# Patient Record
Sex: FEMALE | Race: BLACK OR AFRICAN AMERICAN | NOT HISPANIC OR LATINO | Employment: FULL TIME | URBAN - METROPOLITAN AREA
[De-identification: names, ages, dates, MRNs, and addresses within clinical notes are randomized per-mention and may not be internally consistent; named-entity substitution may affect disease eponyms.]

---

## 2017-05-19 ENCOUNTER — COMMUNICATION - HEALTHEAST (OUTPATIENT)
Dept: TELEHEALTH | Facility: CLINIC | Age: 22
End: 2017-05-19

## 2017-05-19 ENCOUNTER — OFFICE VISIT - HEALTHEAST (OUTPATIENT)
Dept: FAMILY MEDICINE | Facility: CLINIC | Age: 22
End: 2017-05-19

## 2017-05-19 DIAGNOSIS — J30.2 SEASONAL ALLERGIES: ICD-10-CM

## 2017-05-19 DIAGNOSIS — Z30.9 CONTRACEPTION MANAGEMENT: ICD-10-CM

## 2017-05-19 DIAGNOSIS — J45.20 MILD INTERMITTENT ASTHMA: ICD-10-CM

## 2017-05-19 DIAGNOSIS — E66.9 OBESITY: ICD-10-CM

## 2017-05-19 DIAGNOSIS — R87.612 LOW GRADE SQUAMOUS INTRAEPITH LESION ON CYTOLOGIC SMEAR CERVIX (LGSIL): ICD-10-CM

## 2017-05-19 DIAGNOSIS — N91.2 AMENORRHEA: ICD-10-CM

## 2017-05-19 ASSESSMENT — MIFFLIN-ST. JEOR: SCORE: 2028.86

## 2017-05-31 ENCOUNTER — AMBULATORY - HEALTHEAST (OUTPATIENT)
Dept: NURSING | Facility: CLINIC | Age: 22
End: 2017-05-31

## 2017-05-31 DIAGNOSIS — N91.2 AMENORRHEA: ICD-10-CM

## 2017-08-30 ENCOUNTER — AMBULATORY - HEALTHEAST (OUTPATIENT)
Dept: NURSING | Facility: CLINIC | Age: 22
End: 2017-08-30

## 2017-08-30 DIAGNOSIS — Z30.42 DEPO-PROVERA CONTRACEPTIVE STATUS: ICD-10-CM

## 2018-01-24 ENCOUNTER — COMMUNICATION - HEALTHEAST (OUTPATIENT)
Dept: FAMILY MEDICINE | Facility: CLINIC | Age: 23
End: 2018-01-24

## 2018-09-20 ENCOUNTER — COMMUNICATION - HEALTHEAST (OUTPATIENT)
Dept: FAMILY MEDICINE | Facility: CLINIC | Age: 23
End: 2018-09-20

## 2019-11-21 ENCOUNTER — HOSPITAL ENCOUNTER (EMERGENCY)
Facility: CLINIC | Age: 24
Discharge: HOME | End: 2019-11-22
Attending: EMERGENCY MEDICINE | Admitting: EMERGENCY MEDICINE
Payer: COMMERCIAL

## 2019-11-21 DIAGNOSIS — R51.9 NONINTRACTABLE HEADACHE, UNSPECIFIED CHRONICITY PATTERN, UNSPECIFIED HEADACHE TYPE: ICD-10-CM

## 2019-11-21 PROCEDURE — 99284 EMERGENCY DEPT VISIT MOD MDM: CPT | Mod: Z6 | Performed by: EMERGENCY MEDICINE

## 2019-11-21 PROCEDURE — 25000132 ZZH RX MED GY IP 250 OP 250 PS 637: Performed by: EMERGENCY MEDICINE

## 2019-11-21 PROCEDURE — 99283 EMERGENCY DEPT VISIT LOW MDM: CPT

## 2019-11-21 RX ORDER — DIPHENHYDRAMINE HCL 25 MG
25 CAPSULE ORAL ONCE
Status: COMPLETED | OUTPATIENT
Start: 2019-11-21 | End: 2019-11-21

## 2019-11-21 RX ORDER — METOCLOPRAMIDE 10 MG/1
10 TABLET ORAL
Status: DISCONTINUED | OUTPATIENT
Start: 2019-11-21 | End: 2019-11-22 | Stop reason: HOSPADM

## 2019-11-21 RX ORDER — CYCLOBENZAPRINE HCL 10 MG
5 TABLET ORAL 3 TIMES DAILY PRN
Qty: 10 TABLET | Refills: 0 | Status: SHIPPED | OUTPATIENT
Start: 2019-11-21

## 2019-11-21 RX ORDER — IBUPROFEN 100 MG/5ML
400 SUSPENSION, ORAL (FINAL DOSE FORM) ORAL ONCE
Status: COMPLETED | OUTPATIENT
Start: 2019-11-21 | End: 2019-11-21

## 2019-11-21 RX ORDER — IBUPROFEN 800 MG/1
800 TABLET, FILM COATED ORAL EVERY 8 HOURS PRN
COMMUNITY

## 2019-11-21 RX ORDER — CYCLOBENZAPRINE HCL 10 MG
10 TABLET ORAL ONCE
Status: COMPLETED | OUTPATIENT
Start: 2019-11-21 | End: 2019-11-21

## 2019-11-21 RX ADMIN — IBUPROFEN 400 MG: 100 SUSPENSION ORAL at 21:52

## 2019-11-21 RX ADMIN — METOCLOPRAMIDE 10 MG: 10 TABLET ORAL at 22:39

## 2019-11-21 RX ADMIN — CYCLOBENZAPRINE 10 MG: 10 TABLET, FILM COATED ORAL at 23:56

## 2019-11-21 RX ADMIN — DIPHENHYDRAMINE HYDROCHLORIDE 25 MG: 25 CAPSULE ORAL at 22:30

## 2019-11-21 ASSESSMENT — ENCOUNTER SYMPTOMS
ABDOMINAL PAIN: 0
CONFUSION: 0
ARTHRALGIAS: 0
SHORTNESS OF BREATH: 0
DIFFICULTY URINATING: 0
NECK STIFFNESS: 0
HEADACHES: 1
FEVER: 0
PHOTOPHOBIA: 1
NAUSEA: 1
EYE REDNESS: 0
COLOR CHANGE: 0

## 2019-11-21 ASSESSMENT — PAIN DESCRIPTION - DESCRIPTORS
DESCRIPTORS: HEADACHE
DESCRIPTORS: HEADACHE

## 2019-11-21 NOTE — LETTER
November 21, 2019      To Whom It May Concern:      Troy Knowles was seen in our Emergency Department today, 11/21/19.  I expect her condition to improve over the next 2 days.  She may return to work when improved.    Sincerely,        Mulugeta Mcgregor, DO

## 2019-11-21 NOTE — ED AVS SNAPSHOT
[facility], [place], Emergency Department  [address]  [facility] [place] [address]  Phone:  [phone]  Fax:  [phone]                                    Troy Knowles   MRN: [id number]    Department:  [facility], Emergency Department   Date of Visit:  11/21/2019           After Visit Summary Signature Page    I have received my discharge instructions, and my questions have been answered. I have discussed any challenges I see with this plan with the nurse or doctor.    ..........................................................................................................................................  Patient/Patient Representative Signature      ..........................................................................................................................................  Patient Representative Print Name and Relationship to Patient    ..................................................               ................................................  Date                                   Time    ..........................................................................................................................................  Reviewed by Signature/Title    ...................................................              ..............................................  Date                                               Time          22EPIC Rev 08/18

## 2019-11-22 VITALS
OXYGEN SATURATION: 97 % | HEART RATE: 84 BPM | SYSTOLIC BLOOD PRESSURE: 137 MMHG | TEMPERATURE: 97.8 F | RESPIRATION RATE: 15 BRPM | DIASTOLIC BLOOD PRESSURE: 90 MMHG

## 2019-11-22 NOTE — DISCHARGE INSTRUCTIONS
Please make an appointment to follow up with [facility] (phone: [phone] as soon as possible to establish care. Return to the emergency department if symptoms continue, get worse, there are any new symptoms or any cause for concern.

## 2019-11-22 NOTE — ED PROVIDER NOTES
[facility] EMERGENCY DEPARTMENT ([facility])    11/21/19     ED 20  10:11 PM   History     Chief Complaint   Patient presents with     Headache     Hx of migraines, on day 3 of migraine pain in back/front of head     The history is provided by the patient and medical records.     Troy Knowles is a 24 year old female who presents with migraine headache for the past 3 days. She was at work when her headache began. Headache is focal to frontal and occipital head. In he past she used to get similar headaches all the time, less so lately. She wonders if it is related to her menstrual cycle. She denies any other triggers. She endorses nausea. She has photophobia and phonophobia, no vision changes. She tried ibuprofen 800 mg yesterday.     I have reviewed the Medications, Allergies, Past Medical and Surgical History, and Social History in the [employer] system.  PAST MEDICAL HISTORY: History reviewed. No pertinent past medical history.    PAST SURGICAL HISTORY: History reviewed. No pertinent surgical history.    FAMILY HISTORY: History reviewed. No pertinent family history.    SOCIAL HISTORY:   Social History     Tobacco Use     Smoking status: Never Smoker     Smokeless tobacco: Never Used   Substance Use Topics     Alcohol use: Not Currently       Patient's Medications   New Prescriptions    No medications on file   Previous Medications    IBUPROFEN (ADVIL/MOTRIN) 800 MG TABLET    Take 800 mg by mouth every 8 hours as needed for moderate pain   Modified Medications    No medications on file   Discontinued Medications    No medications on file          Allergies   Allergen Reactions     Latex Rash      Review of Systems   Constitutional: Negative for fever.   HENT: Negative for congestion.    Eyes: Positive for photophobia. Negative for redness and visual disturbance.   Respiratory: Negative for shortness of breath.    Cardiovascular: Negative for chest pain.   Gastrointestinal: Positive for nausea. Negative for  abdominal pain.   Genitourinary: Negative for difficulty urinating.   Musculoskeletal: Negative for arthralgias and neck stiffness.   Skin: Negative for color change.   Neurological: Positive for headaches.   Psychiatric/Behavioral: Negative for confusion.   All other systems reviewed and are negative.      Physical Exam   BP: (!) 157/99  Pulse: 82  Temp: 97.6  F (36.4  C)  Resp: 16  SpO2: 98 %      Physical Exam  Constitutional:       General: She is not in acute distress.     Appearance: She is well-developed. She is not diaphoretic.   HENT:      Head: Normocephalic and atraumatic.      Mouth/Throat:      Pharynx: No oropharyngeal exudate.   Eyes:      General: No scleral icterus.        Right eye: No discharge.         Left eye: No discharge.      Pupils: Pupils are equal, round, and reactive to light.   Neck:      Musculoskeletal: Normal range of motion and neck supple. No neck rigidity.   Cardiovascular:      Rate and Rhythm: Normal rate and regular rhythm.      Heart sounds: Normal heart sounds. No murmur. No friction rub. No gallop.    Pulmonary:      Effort: Pulmonary effort is normal. No respiratory distress.      Breath sounds: Normal breath sounds. No wheezing.   Chest:      Chest wall: No tenderness.   Abdominal:      General: Bowel sounds are normal. There is no distension.      Palpations: Abdomen is soft.      Tenderness: There is no abdominal tenderness.   Musculoskeletal: Normal range of motion.         General: No tenderness or deformity.   Skin:     General: Skin is warm and dry.      Coloration: Skin is not pale.      Findings: No erythema or rash.   Neurological:      General: No focal deficit present.      Mental Status: She is alert and oriented to person, place, and time. Mental status is at baseline.      Cranial Nerves: No cranial nerve deficit.         ED Course        Procedures  No results found for this or any previous visit (from the past 24 hour(s)).  Medications   ibuprofen  (ADVIL/MOTRIN) suspension 400 mg (400 mg Oral Given 11/21/19 7166)         Assessments & Plan (with Medical Decision Making)   Is a 24-year-old female who presents with headache.  This is been present for 3 days.  She has had several similar headaches in the past that she attributes to migraines.  No known precipitating factors for this episode.  Exam demonstrates no acute abnormalities.  There is no neck rigidity.  No focal neuro deficits.  Patient was given ibuprofen, metoclopramide and diphenhydramine with a decrease in her headache.  However she did continue to still have some residual headache.  Due to the nature of this headache there may be some tension component to it and we will trial patient on a short course of a muscle relaxer. Will discharge home with return precautions. Discussed reasons to return to the emergency department.  Patient understands and agrees with this plan.    I have reviewed the nursing notes.    I have reviewed the findings, diagnosis, plan and need for follow up with the patient.    New Prescriptions    No medications on file       Final diagnoses:   None     I, Sejal Olivares, am serving as a trained medical scribe to document services personally performed by Mulugeta Mcgregor DO based on the provider's statements to me on November 21, 2019.  This document has been checked and approved by the attending provider.    I, Mulugeta Mcgregor DO, was physically present and have reviewed and verified the accuracy of this note documented by Sejal Olivares, medical scribe.     11/21/2019   [facility], EMERGENCY DEPARTMENT     Mulugeta Mcgregor DO  11/21/19 [id number]

## 2021-05-31 VITALS — WEIGHT: 285 LBS | BODY MASS INDEX: 47.48 KG/M2 | HEIGHT: 65 IN

## 2021-06-10 NOTE — PROGRESS NOTES
[facility]  FAMILY  MEDICINE  OFFICE  VISIT     ASSESSMENT & PLAN     Diagnoses and all orders for this visit:    Mild intermittent asthma  -     fluticasone (FLOVENT HFA) 110 mcg/actuation inhaler; Inhale 2 puffs 2 (two) times a day.  Dispense: 1 Inhaler; Refill: 2  -     albuterol (PROAIR HFA;PROVENTIL HFA;VENTOLIN HFA) 90 mcg/actuation inhaler; Inhale 2 puffs every 6 (six) hours as needed for wheezing.  Dispense: 18 g; Refill: 6  We will have her start a daily controller, and give a trial to Xyzal for allergy control.    Seasonal allergies  -     levocetirizine (XYZAL) 2.5 mg/5 mL solution; Take 10 mL (5 mg total) by mouth every evening.  Dispense: 148 mL; Refill: 12    Amenorrhea  -     Pregnancy, Urine    Low grade squamous intraepith lesion on cytologic smear cervix (lgsil)  she will make an appointment in the summer for a repeat Pap smear  Obesity  encouraged weight loss program.  Contraception management  We will get an urine pregnancy test today, have her abstain from sex for the next 2 weeks, and do another one before she get her Depo shot       SUBJECTIVE   No specialty comments available.  S: 20 yo Female with PMH of seasonal allergies and asthma who presents today with dry cough x1 week and to begin her Depo shot for birth control. Patient has severe seasonal allergies and had symptoms of sneezing, rhinorrhea, watery eyes and itching eyes for 2 weeks. Approximately 1 week ago she began having severe dry cough. Over the last 3 days cough has been improving, but is now productive of whitish phlegm. Patient does not currently have a rescue inhaler as she has not had symptoms of asthma for many years, but she has been using nephews inhaler 2x/day for approximately 1 week to help with symptoms of SOB and coughing. She does note some new chest pain that is present with deep inspiration.  Patient requesting Depo shot today for birth  "control. She has used Depo shot for past 7 years and she gets her cycle every 3 months. Her weight has remained stable. Her last shot was Nov. 2016 at [facility].      OBJECTIVE    /76 (Patient Site: Right Arm)  Pulse 72  Temp 98.3  F (36.8  C) (Oral)   Resp 15  Ht 5' 4.7\" (1.643 m)  Wt (!) 285 lb (129.3 kg)  LMP 01/01/2010  SpO2 98%  BMI 47.87 kg/m2  Physical Exam   Constitutional: She appears well-developed and well-nourished.   HENT:   Head: Normocephalic and atraumatic.   Neck: Normal range of motion. Neck supple. No JVD present. No tracheal deviation present. No thyromegaly present.   Cardiovascular: Normal rate, regular rhythm, normal heart sounds and intact distal pulses.  Exam reveals no gallop and no friction rub.    No murmur heard.  Pulmonary/Chest: Effort normal and breath sounds normal. No respiratory distress. She has no wheezes. She has no rales.   Musculoskeletal: She exhibits no edema or tenderness.   Lymphadenopathy:     She has no cervical adenopathy.   Psychiatric: She has a normal mood and affect. Judgment and thought content normal.     Mora Whatley MD  Family Medicine, [facility]   This transcription uses voice recognition software, which may contain typographical errors. Chief Complaint:    Chief Complaint   Patient presents with     Cough      \"DEEP, DRY, COUGH\"     Injections     RESTART DEPO     Allergies     \"POSS ALLERGIES\"     Troy Knowles is a 21 y.o. female.  05/19/17 - Updated [facility]       Health Maintenance Due   Topic Date Due     ASTHMA CONTROL TEST  1994     ASTHMA FOLLOW-UP  1994     CHLAMYDIA SCREENING  11/06/2009     PAP SMEAR  11/06/2015       Patient Active Problem List   Diagnosis     Obesity     Low grade squamous intraepith lesion on cytologic smear cervix (lgsil)     Mild intermittent asthma     Seasonal allergies     No past medical history on file.    Current Outpatient Prescriptions   Medication Sig " "Dispense Refill     albuterol (PROAIR HFA;PROVENTIL HFA;VENTOLIN HFA) 90 mcg/actuation inhaler Inhale 2 puffs every 6 (six) hours as needed for wheezing. 18 g 6     fluticasone (FLOVENT HFA) 110 mcg/actuation inhaler Inhale 2 puffs 2 (two) times a day. 1 Inhaler 2     levocetirizine (XYZAL) 2.5 mg/5 mL solution Take 10 mL (5 mg total) by mouth every evening. 148 mL 12     No current facility-administered medications for this visit.      Allergies as of 05/19/2017     (No Known Allergies)     ROS    \"Review of systems complete head to toe is otherwise negative.\"}  OTHER     Wt Readings from Last 3 Encounters:   05/19/17 (!) 285 lb (129.3 kg)       PHQ-9:  PHQ-2 Total Score: 0 (5/19/2017  3:00 PM)    No Follow-up on file.    Social History   Substance Use Topics     Smoking status: Former Smoker     Smokeless tobacco: None     Alcohol use None       Family History   Problem Relation Age of Onset     No Medical Problems Mother      Diabetes Father      Stroke Father        No past surgical history on file.    Social History     Social History Narrative     No narrative on file       RESULTS (Lab/Rad)     Recent Results (from the past 168 hour(s))   Pregnancy, Urine   Result Value Ref Range    Pregnancy Test, Urine Negative Negative    Specific Gravity, UA 1.015 1.001 - 1.030     No results found.           "

## 2021-06-15 PROBLEM — E66.9 OBESITY: Status: ACTIVE | Noted: 2017-05-19

## 2021-06-15 PROBLEM — R87.612 LOW GRADE SQUAMOUS INTRAEPITH LESION ON CYTOLOGIC SMEAR CERVIX (LGSIL): Status: ACTIVE | Noted: 2017-05-19

## 2021-06-15 PROBLEM — J45.20 MILD INTERMITTENT ASTHMA: Status: ACTIVE | Noted: 2017-05-19

## 2021-06-15 PROBLEM — J30.2 SEASONAL ALLERGIES: Status: ACTIVE | Noted: 2017-05-19

## 2021-07-31 ENCOUNTER — HEALTH MAINTENANCE LETTER (OUTPATIENT)
Age: 26
End: 2021-07-31

## 2022-08-27 ENCOUNTER — HEALTH MAINTENANCE LETTER (OUTPATIENT)
Age: 27
End: 2022-08-27

## 2023-09-23 ENCOUNTER — HEALTH MAINTENANCE LETTER (OUTPATIENT)
Age: 28
End: 2023-09-23